# Patient Record
Sex: FEMALE | Race: WHITE | NOT HISPANIC OR LATINO | URBAN - METROPOLITAN AREA
[De-identification: names, ages, dates, MRNs, and addresses within clinical notes are randomized per-mention and may not be internally consistent; named-entity substitution may affect disease eponyms.]

---

## 2023-11-07 PROBLEM — G95.9 CERVICAL MYELOPATHY WITH CERVICAL RADICULOPATHY (MULTI): Status: ACTIVE | Noted: 2023-11-07

## 2023-11-07 PROBLEM — G95.20 CERVICAL SPINAL CORD COMPRESSION (MULTI): Status: ACTIVE | Noted: 2023-11-07

## 2023-11-07 PROBLEM — M40.202 CERVICAL KYPHOSIS: Status: ACTIVE | Noted: 2023-11-07

## 2023-11-07 PROBLEM — M54.16 CHRONIC LUMBAR RADICULOPATHY: Status: ACTIVE | Noted: 2023-11-07

## 2023-11-07 PROBLEM — M54.12 CERVICAL MYELOPATHY WITH CERVICAL RADICULOPATHY (MULTI): Status: ACTIVE | Noted: 2023-11-07

## 2023-11-07 PROBLEM — M43.12 SPONDYLOLISTHESIS OF CERVICAL REGION: Status: ACTIVE | Noted: 2023-11-07

## 2023-11-07 PROBLEM — M43.10 ACQUIRED SPONDYLOLISTHESIS: Status: ACTIVE | Noted: 2023-11-07

## 2023-11-07 RX ORDER — ZOLPIDEM TARTRATE 10 MG/1
TABLET ORAL
COMMUNITY
Start: 2020-07-01

## 2023-11-07 RX ORDER — ZOLPIDEM TARTRATE 5 MG/1
TABLET ORAL AS NEEDED
COMMUNITY

## 2023-11-07 RX ORDER — LORAZEPAM 1 MG/1
TABLET ORAL
COMMUNITY
Start: 2020-07-01

## 2023-11-07 RX ORDER — SENNOSIDES 25 MG/1
TABLET, FILM COATED ORAL 4 TIMES DAILY
COMMUNITY
Start: 2020-08-28

## 2023-11-07 RX ORDER — NEOMYCIN SULFATE, POLYMYXIN B SULFATE AND DEXAMETHASONE 3.5; 10000; 1 MG/ML; [USP'U]/ML; MG/ML
SUSPENSION/ DROPS OPHTHALMIC
COMMUNITY
Start: 2020-07-02

## 2023-11-07 RX ORDER — PROPRANOLOL HYDROCHLORIDE 20 MG/1
TABLET ORAL
COMMUNITY

## 2023-11-07 RX ORDER — LOSARTAN POTASSIUM 100 MG/1
TABLET ORAL
COMMUNITY
Start: 2020-06-13

## 2023-11-07 RX ORDER — GABAPENTIN 100 MG/1
CAPSULE ORAL
COMMUNITY
Start: 2020-09-08

## 2023-11-07 RX ORDER — AMLODIPINE BESYLATE 5 MG/1
TABLET ORAL 2 TIMES DAILY
COMMUNITY

## 2023-11-07 RX ORDER — ESCITALOPRAM OXALATE 20 MG/1
TABLET ORAL
COMMUNITY
Start: 2020-07-01

## 2023-11-07 RX ORDER — KETOROLAC TROMETHAMINE 10 MG/1
10 TABLET, FILM COATED ORAL 3 TIMES DAILY PRN
COMMUNITY
Start: 2020-09-04

## 2023-11-07 RX ORDER — DIAZEPAM 5 MG/1
.5-1 TABLET ORAL EVERY 8 HOURS PRN
COMMUNITY
Start: 2020-07-28

## 2023-11-07 RX ORDER — SYRING-NEEDL,DISP,INSUL,0.3 ML 29 G X1/2"
100 SYRINGE, EMPTY DISPOSABLE MISCELLANEOUS EVERY 8 HOURS
COMMUNITY
Start: 2020-07-24

## 2023-11-07 RX ORDER — ACETAMINOPHEN 325 MG/1
1-2 TABLET ORAL EVERY 4 HOURS PRN
COMMUNITY
Start: 2020-07-24

## 2023-11-07 RX ORDER — HYDROCODONE BITARTRATE AND ACETAMINOPHEN 5; 325 MG/1; MG/1
1 TABLET ORAL 3 TIMES DAILY PRN
COMMUNITY
Start: 2020-08-03

## 2023-11-07 RX ORDER — PANTOPRAZOLE SODIUM 20 MG/1
1 TABLET, DELAYED RELEASE ORAL DAILY
COMMUNITY

## 2023-11-07 RX ORDER — TIZANIDINE 4 MG/1
TABLET ORAL
COMMUNITY
Start: 2020-06-19

## 2023-11-07 RX ORDER — PANTOPRAZOLE SODIUM 40 MG/1
TABLET, DELAYED RELEASE ORAL
COMMUNITY
Start: 2020-06-14

## 2023-11-08 ENCOUNTER — APPOINTMENT (OUTPATIENT)
Dept: HEMATOLOGY/ONCOLOGY | Facility: CLINIC | Age: 68
End: 2023-11-08
Payer: COMMERCIAL

## 2023-12-28 ENCOUNTER — APPOINTMENT (OUTPATIENT)
Dept: HEMATOLOGY/ONCOLOGY | Facility: CLINIC | Age: 68
End: 2023-12-28
Payer: COMMERCIAL

## 2024-02-01 ENCOUNTER — OFFICE VISIT (OUTPATIENT)
Dept: HEMATOLOGY/ONCOLOGY | Facility: CLINIC | Age: 69
End: 2024-02-01
Payer: COMMERCIAL

## 2024-02-01 VITALS
DIASTOLIC BLOOD PRESSURE: 84 MMHG | OXYGEN SATURATION: 97 % | RESPIRATION RATE: 18 BRPM | HEIGHT: 67 IN | TEMPERATURE: 96.4 F | WEIGHT: 191.91 LBS | HEART RATE: 66 BPM | BODY MASS INDEX: 30.12 KG/M2 | SYSTOLIC BLOOD PRESSURE: 158 MMHG

## 2024-02-01 DIAGNOSIS — D47.3 ESSENTIAL THROMBOCYTOSIS (MULTI): Primary | ICD-10-CM

## 2024-02-01 LAB
ALBUMIN SERPL BCP-MCNC: 4.3 G/DL (ref 3.4–5)
ALP SERPL-CCNC: 68 U/L (ref 33–136)
ALT SERPL W P-5'-P-CCNC: 10 U/L (ref 7–45)
ANION GAP SERPL CALC-SCNC: 19 MMOL/L (ref 10–20)
AST SERPL W P-5'-P-CCNC: 11 U/L (ref 9–39)
BASOPHILS # BLD AUTO: 0.03 X10*3/UL (ref 0–0.1)
BASOPHILS NFR BLD AUTO: 0.4 %
BILIRUB SERPL-MCNC: 0.2 MG/DL (ref 0–1.2)
BUN SERPL-MCNC: 32 MG/DL (ref 6–23)
CALCIUM SERPL-MCNC: 10.7 MG/DL (ref 8.6–10.3)
CHLORIDE SERPL-SCNC: 99 MMOL/L (ref 98–107)
CO2 SERPL-SCNC: 22 MMOL/L (ref 21–32)
CREAT SERPL-MCNC: 1.63 MG/DL (ref 0.5–1.05)
EGFRCR SERPLBLD CKD-EPI 2021: 34 ML/MIN/1.73M*2
EOSINOPHIL # BLD AUTO: 0.76 X10*3/UL (ref 0–0.7)
EOSINOPHIL NFR BLD AUTO: 9.3 %
ERYTHROCYTE [DISTWIDTH] IN BLOOD BY AUTOMATED COUNT: 13.3 % (ref 11.5–14.5)
FERRITIN SERPL-MCNC: 133 NG/ML (ref 8–150)
FOLATE SERPL-MCNC: 11.5 NG/ML
GLUCOSE SERPL-MCNC: 170 MG/DL (ref 74–99)
HAV AB SER QL IA: REACTIVE
HBV CORE AB SER QL: NONREACTIVE
HBV SURFACE AG SERPL QL IA: NONREACTIVE
HCT VFR BLD AUTO: 36.5 % (ref 36–46)
HGB BLD-MCNC: 12.1 G/DL (ref 12–16)
HGB RETIC QN: 36 PG (ref 28–38)
IGA SERPL-MCNC: 218 MG/DL (ref 70–400)
IGG SERPL-MCNC: 1620 MG/DL (ref 700–1600)
IGM SERPL-MCNC: 30 MG/DL (ref 40–230)
IMM GRANULOCYTES # BLD AUTO: 0.02 X10*3/UL (ref 0–0.7)
IMM GRANULOCYTES NFR BLD AUTO: 0.2 % (ref 0–0.9)
IMMATURE RETIC FRACTION: 5.4 %
IRON SATN MFR SERPL: 18 % (ref 25–45)
IRON SERPL-MCNC: 65 UG/DL (ref 35–150)
LDH SERPL L TO P-CCNC: 91 U/L (ref 84–246)
LYMPHOCYTES # BLD AUTO: 4.6 X10*3/UL (ref 1.2–4.8)
LYMPHOCYTES NFR BLD AUTO: 56.2 %
MCH RBC QN AUTO: 31.3 PG (ref 26–34)
MCHC RBC AUTO-ENTMCNC: 33.2 G/DL (ref 32–36)
MCV RBC AUTO: 94 FL (ref 80–100)
MONOCYTES # BLD AUTO: 0.72 X10*3/UL (ref 0.1–1)
MONOCYTES NFR BLD AUTO: 8.8 %
NEUTROPHILS # BLD AUTO: 2.05 X10*3/UL (ref 1.2–7.7)
NEUTROPHILS NFR BLD AUTO: 25.1 %
NRBC BLD-RTO: ABNORMAL /100{WBCS}
PLATELET # BLD AUTO: 474 X10*3/UL (ref 150–450)
POTASSIUM SERPL-SCNC: 3.4 MMOL/L (ref 3.5–5.3)
PROT SERPL-MCNC: 7.4 G/DL (ref 6.4–8.2)
PROT SERPL-MCNC: 7.8 G/DL (ref 6.4–8.2)
RBC # BLD AUTO: 3.87 X10*6/UL (ref 4–5.2)
RETICS #: 0.06 X10*6/UL (ref 0.02–0.11)
RETICS/RBC NFR AUTO: 1.6 % (ref 0.5–2)
SODIUM SERPL-SCNC: 137 MMOL/L (ref 136–145)
TIBC SERPL-MCNC: 359 UG/DL (ref 240–445)
UIBC SERPL-MCNC: 294 UG/DL (ref 110–370)
VIT B12 SERPL-MCNC: 373 PG/ML (ref 211–911)
WBC # BLD AUTO: 8.2 X10*3/UL (ref 4.4–11.3)

## 2024-02-01 PROCEDURE — 1159F MED LIST DOCD IN RCRD: CPT | Performed by: PHYSICIAN ASSISTANT

## 2024-02-01 PROCEDURE — 99205 OFFICE O/P NEW HI 60 MIN: CPT | Performed by: PHYSICIAN ASSISTANT

## 2024-02-01 PROCEDURE — 1036F TOBACCO NON-USER: CPT | Performed by: PHYSICIAN ASSISTANT

## 2024-02-01 PROCEDURE — 82728 ASSAY OF FERRITIN: CPT | Performed by: PHYSICIAN ASSISTANT

## 2024-02-01 PROCEDURE — 83615 LACTATE (LD) (LDH) ENZYME: CPT | Performed by: PHYSICIAN ASSISTANT

## 2024-02-01 PROCEDURE — 1125F AMNT PAIN NOTED PAIN PRSNT: CPT | Performed by: PHYSICIAN ASSISTANT

## 2024-02-01 PROCEDURE — 87340 HEPATITIS B SURFACE AG IA: CPT | Mod: GEALAB | Performed by: PHYSICIAN ASSISTANT

## 2024-02-01 PROCEDURE — 86334 IMMUNOFIX E-PHORESIS SERUM: CPT | Mod: GEALAB | Performed by: PHYSICIAN ASSISTANT

## 2024-02-01 PROCEDURE — 36415 COLL VENOUS BLD VENIPUNCTURE: CPT | Performed by: PHYSICIAN ASSISTANT

## 2024-02-01 PROCEDURE — 86704 HEP B CORE ANTIBODY TOTAL: CPT | Mod: GEALAB | Performed by: PHYSICIAN ASSISTANT

## 2024-02-01 PROCEDURE — 99215 OFFICE O/P EST HI 40 MIN: CPT | Performed by: PHYSICIAN ASSISTANT

## 2024-02-01 PROCEDURE — 80053 COMPREHEN METABOLIC PANEL: CPT | Performed by: PHYSICIAN ASSISTANT

## 2024-02-01 PROCEDURE — 86706 HEP B SURFACE ANTIBODY: CPT | Mod: GEALAB | Performed by: PHYSICIAN ASSISTANT

## 2024-02-01 PROCEDURE — 85025 COMPLETE CBC W/AUTO DIFF WBC: CPT | Performed by: PHYSICIAN ASSISTANT

## 2024-02-01 PROCEDURE — 86708 HEPATITIS A ANTIBODY: CPT | Mod: GEALAB | Performed by: PHYSICIAN ASSISTANT

## 2024-02-01 PROCEDURE — 84165 PROTEIN E-PHORESIS SERUM: CPT | Performed by: PHYSICIAN ASSISTANT

## 2024-02-01 PROCEDURE — 86038 ANTINUCLEAR ANTIBODIES: CPT | Mod: GEALAB | Performed by: PHYSICIAN ASSISTANT

## 2024-02-01 PROCEDURE — 83521 IG LIGHT CHAINS FREE EACH: CPT | Mod: GEALAB | Performed by: PHYSICIAN ASSISTANT

## 2024-02-01 PROCEDURE — 84155 ASSAY OF PROTEIN SERUM: CPT | Mod: GEALAB | Performed by: PHYSICIAN ASSISTANT

## 2024-02-01 PROCEDURE — 86320 SERUM IMMUNOELECTROPHORESIS: CPT | Performed by: PHYSICIAN ASSISTANT

## 2024-02-01 PROCEDURE — 82784 ASSAY IGA/IGD/IGG/IGM EACH: CPT | Mod: GEALAB | Performed by: PHYSICIAN ASSISTANT

## 2024-02-01 PROCEDURE — G0452 MOLECULAR PATHOLOGY INTERPR: HCPCS | Performed by: PHYSICIAN ASSISTANT

## 2024-02-01 PROCEDURE — 82607 VITAMIN B-12: CPT | Mod: GEALAB | Performed by: PHYSICIAN ASSISTANT

## 2024-02-01 PROCEDURE — 81450 HL NEO GSAP 5-50DNA/DNA&RNA: CPT | Performed by: PHYSICIAN ASSISTANT

## 2024-02-01 PROCEDURE — 83540 ASSAY OF IRON: CPT | Performed by: PHYSICIAN ASSISTANT

## 2024-02-01 PROCEDURE — 85045 AUTOMATED RETICULOCYTE COUNT: CPT | Performed by: PHYSICIAN ASSISTANT

## 2024-02-01 PROCEDURE — 82746 ASSAY OF FOLIC ACID SERUM: CPT | Mod: GEALAB | Performed by: PHYSICIAN ASSISTANT

## 2024-02-01 ASSESSMENT — ENCOUNTER SYMPTOMS
OCCASIONAL FEELINGS OF UNSTEADINESS: 0
DEPRESSION: 0

## 2024-02-01 ASSESSMENT — PATIENT HEALTH QUESTIONNAIRE - PHQ9
1. LITTLE INTEREST OR PLEASURE IN DOING THINGS: NOT AT ALL
2. FEELING DOWN, DEPRESSED OR HOPELESS: NOT AT ALL
SUM OF ALL RESPONSES TO PHQ9 QUESTIONS 1 AND 2: 0

## 2024-02-01 ASSESSMENT — PAIN SCALES - GENERAL: PAINLEVEL: 8

## 2024-02-01 ASSESSMENT — COLUMBIA-SUICIDE SEVERITY RATING SCALE - C-SSRS
2. HAVE YOU ACTUALLY HAD ANY THOUGHTS OF KILLING YOURSELF?: NO
6. HAVE YOU EVER DONE ANYTHING, STARTED TO DO ANYTHING, OR PREPARED TO DO ANYTHING TO END YOUR LIFE?: NO
1. IN THE PAST MONTH, HAVE YOU WISHED YOU WERE DEAD OR WISHED YOU COULD GO TO SLEEP AND NOT WAKE UP?: NO

## 2024-02-01 NOTE — PROGRESS NOTES
Reason for Visit  Lita Garcia is a 68 y.o. AA female, everyday smoker,  referred by Dr. Carrizales for thrombocytosis.    PMH/PSH: HTN, Pre-DM, Vitamin D deficiency, Vitamin B12 deficiency, osteoporosis, Chronic pain syndrome.  FH: NC  Soc Hx: Smoker, denies ETOH and illicit drugs; Retired clinical therapist, , 2 children.    History of Present Illness:  Upon review of labs, noted to have thrombocytosis since 2020 with intermittent leucocytosis and anemia.     On assessment, reports back pain due to nerve damage otherwise feeling well. Denies B symptoms, n/v/d/abd pain, SOB/SOB, CP, palpitations, rash or any other complaint at this time.    Review of Systems: All of the systems have been reviewed and are negative for complaints except what is stated in the HPI and/or past medical history.    Allergies and Intolerances:  Allergies   Allergen Reactions    Diphenhydramine Hcl Anxiety and Unknown    Gabapentin Nausea And Vomiting and Nausea/vomiting    Meloxicam Nausea/vomiting    Oxycodone Itching    Oxycodone-Acetaminophen Itching              Outpatient Medication Profile:  Current Outpatient Medications   Medication Sig Dispense Refill    amLODIPine (Norvasc) 5 mg tablet Take by mouth twice a day.      LORazepam (Ativan) 1 mg tablet LORazepam 1 MG Oral Tablet   Quantity: 30  Refills: 0        Start : 1-Jul-2020   Active      losartan (Cozaar) 100 mg tablet Losartan Potassium 100 MG Oral Tablet   Quantity: 30  Refills: 0        Start : 13-Jun-2020   Active      pantoprazole (ProtoNix) 40 mg EC tablet Pantoprazole Sodium 40 MG Oral Tablet Delayed Release   Quantity: 30  Refills: 0        Start : 14-Jun-2020   Active      propranolol (Inderal) 20 mg tablet orally once a day (at bedtime)      tiZANidine (Zanaflex) 4 mg tablet tiZANidine HCl - 4 MG Oral Tablet   Quantity: 30  Refills: 0        Start : 19-Jun-2020   Active      acetaminophen (Tylenol) 325 mg tablet Take 1-2 tablets (325-650 mg) by mouth every 4  hours if needed for mild pain (1 - 3).      diazePAM (Valium) 5 mg tablet Take 0.5-1 tablets (2.5-5 mg) by mouth every 8 hours if needed for muscle spasms.      escitalopram (Lexapro) 20 mg tablet Escitalopram Oxalate 20 MG Oral Tablet   Quantity: 30  Refills: 0        Start : 1-Jul-2020   Active      gabapentin (Neurontin) 100 mg capsule Take 1 pill 3 times daily for 3 days; then take 2 pills 3 times daily for 3 days; then take 3 pills 3 times daily. Stay at this dose      HYDROcodone-acetaminophen (Norco) 5-325 mg tablet Take 1 tablet by mouth 3 times a day as needed for moderate pain (4 - 6).      ketorolac (Toradol) 10 mg tablet Take 1 tablet (10 mg) by mouth 3 times a day as needed (Pain).      lidocaine (Xylocaine) 5 % cream cream 4 times a day.      magnesium citrate solution Take 100 mL by mouth every 8 hours.      neomycin-polymyxin-dexAMETHasone (Maxitrol) 3.5mg/mL-10,000 unit/mL-0.1 % ophthalmic suspension Neomycin-Polymyxin-Dexameth 3.5-62068-4.1 Ophthalmic Suspension   Quantity: 5  Refills: 0        Start : 2-Jul-2020   Active      pantoprazole (Protonix) 20 mg EC tablet Take 1 tablet (20 mg) by mouth once daily.      propranolol XL (Innopran XL) 80 mg 24 hr capsule Propranolol HCl ER 80 MG Oral Capsule Extended Release 24 Hour   Quantity: 30  Refills: 0        Start : 19-Jun-2020   Active      zolpidem (Ambien) 10 mg tablet Zolpidem Tartrate 10 MG Oral Tablet   Quantity: 30  Refills: 0        Start : 1-Jul-2020   Active      zolpidem (Ambien) 5 mg tablet if needed.       No current facility-administered medications for this visit.        Vitals and Measurements:   Visit Vitals  /84 (BP Location: Left arm, Patient Position: Sitting, BP Cuff Size: Large adult)   Pulse 66   Temp 35.8 °C (96.4 °F) (Temporal)   Resp 18        Physical Exam:   Constitutional: alert, awake and oriented, not in acute distress   HEENT: moist mucous membranes, normal nose   Neck: supple, no lymphadenopathy   EYES: PERRL,  "EOM intact, conjunctiva normal  Skin: no jaundice, rash or erythema  Neurological: AAOx3, no gross focal deficit   Psychiatric: normal mood and behavior     Lab Results   Component Value Date    WBC 8.2 02/01/2024    NEUTROABS 2.05 02/01/2024    IGABSOL 0.02 02/01/2024    LYMPHSABS 4.60 02/01/2024    MONOSABS 0.72 02/01/2024    EOSABS 0.76 (H) 02/01/2024    BASOSABS 0.03 02/01/2024    RBC 3.87 (L) 02/01/2024    MCV 94 02/01/2024    MCHC 33.2 02/01/2024    HGB 12.1 02/01/2024    HCT 36.5 02/01/2024     (H) 02/01/2024     Lab Results   Component Value Date    RETICCTPCT 1.6 02/01/2024      Lab Results   Component Value Date    CREATININE 1.63 (H) 02/01/2024    BUN 32 (H) 02/01/2024    EGFR 34 (L) 02/01/2024     02/01/2024    K 3.4 (L) 02/01/2024    CL 99 02/01/2024    CO2 22 02/01/2024      Lab Results   Component Value Date    ALT 10 02/01/2024    AST 11 02/01/2024    ALKPHOS 68 02/01/2024    BILITOT 0.2 02/01/2024      No results found for: \"TSH\"  No results found for: \"TSH\", \"D2FPGYH\", \"I8RJSVN\", \"THYROIDPAB\"  Lab Results   Component Value Date    IRON 65 02/01/2024    TIBC 359 02/01/2024    FERRITIN 133 02/01/2024      Lab Results   Component Value Date    IEXSKGIN22 373 02/01/2024      Lab Results   Component Value Date    FOLATE 11.5 02/01/2024     No results found for: \"SULEIMAN\", \"RF\", \"SEDRATE\"   Lab Results   Component Value Date    CRP 0.62 08/06/2021      No results found for: \"ABENA\"  Lab Results   Component Value Date    LDH 91 02/01/2024      Assessment:    68 y.o. AA female, everyday smoker,  referred by Dr. Carrizales for thrombocytosis.      Plan:    Reviewed and discussed lab, imaging, and pathology results with patient in detail as well as diagnosis, prognosis, and treatment options.    Will sed work up including MPN NGS, BCR-able and plasma cell dyscrasia.    Discussed staying up to date with screening studies    F/U w/PCP    RTC in 2-3 weeks    Patient verbalized understanding, and all her " questions were answered to her satisfaction.     60 min spent with patient greater than 50 % of which was spent in consultation, counselling and coordination of care.

## 2024-02-02 ENCOUNTER — TELEPHONE (OUTPATIENT)
Dept: HEMATOLOGY/ONCOLOGY | Facility: CLINIC | Age: 69
End: 2024-02-02
Payer: COMMERCIAL

## 2024-02-02 LAB
ANA SER QL HEP2 SUBST: NEGATIVE
HBV SURFACE AB SER-ACNC: 12.3 MIU/ML
KAPPA LC SERPL-MCNC: 6.07 MG/DL (ref 0.33–1.94)
KAPPA LC/LAMBDA SER: 3.57 {RATIO} (ref 0.26–1.65)
LAMBDA LC SERPL-MCNC: 1.7 MG/DL (ref 0.57–2.63)

## 2024-02-05 LAB
ALBUMIN: 4.3 G/DL (ref 3.4–5)
ALPHA 1 GLOBULIN: 0.3 G/DL (ref 0.2–0.6)
ALPHA 2 GLOBULIN: 1 G/DL (ref 0.4–1.1)
BETA GLOBULIN: 1 G/DL (ref 0.5–1.2)
GAMMA GLOBULIN: 1.2 G/DL (ref 0.5–1.4)
IMMUNOFIXATION COMMENT: NORMAL
PATH REVIEW - SERUM IMMUNOFIXATION: NORMAL
PATH REVIEW-SERUM PROTEIN ELECTROPHORESIS: NORMAL
PROTEIN ELECTROPHORESIS COMMENT: NORMAL

## 2024-02-07 LAB
ELECTRONICALLY SIGNED BY: NORMAL
MYELOID NGS RESULTS: NORMAL

## 2024-02-21 ENCOUNTER — TELEMEDICINE (OUTPATIENT)
Dept: HEMATOLOGY/ONCOLOGY | Facility: CLINIC | Age: 69
End: 2024-02-21
Payer: COMMERCIAL

## 2024-02-21 DIAGNOSIS — D47.3 ESSENTIAL THROMBOCYTOSIS (MULTI): Primary | ICD-10-CM

## 2024-02-21 PROCEDURE — 1125F AMNT PAIN NOTED PAIN PRSNT: CPT | Performed by: PHYSICIAN ASSISTANT

## 2024-02-21 PROCEDURE — 1159F MED LIST DOCD IN RCRD: CPT | Performed by: PHYSICIAN ASSISTANT

## 2024-02-21 PROCEDURE — 99214 OFFICE O/P EST MOD 30 MIN: CPT | Performed by: PHYSICIAN ASSISTANT

## 2024-02-21 PROCEDURE — 1036F TOBACCO NON-USER: CPT | Performed by: PHYSICIAN ASSISTANT

## 2024-02-21 NOTE — PROGRESS NOTES
Visit Type: Benign Heme Follow-up, Virtual Visit:  A Virtual visit (telephone only) between the patient (at the originating site) and the provider (at the distant site) was utilized to provide this telehealth service.   Verbal Consent for Encounter: Verbal consent was requested and obtained from patient, or from parent/guardian if minor, on this date for a telehealth visit.     Reason for Visit  Lita Garcia is a 68 y.o. AA female, everyday smoker,  referred by Dr. Carrizales for thrombocytosis.    Upon review of labs, noted to have thrombocytosis since 2020 with intermittent leucocytosis and anemia.     On assessment, reports back pain due to nerve damage otherwise feeling well. Denies B symptoms, n/v/d/abd pain, SOB/SOB, CP, palpitations, rash or any other complaint at this time.    PMH/PSH: HTN, Pre-DM, Vitamin D deficiency, Vitamin B12 deficiency, osteoporosis, Chronic pain syndrome.  FH: NC  Soc Hx: Smoker, denies ETOH and illicit drugs; Retired clinical therapist, , 2 children.    History of Present Illness:  C/o neck pain but otherwise doing well.     Review of Systems: All of the systems have been reviewed and are negative for complaints except what is stated in the HPI and/or past medical history.    Allergies and Intolerances:  Allergies   Allergen Reactions    Diphenhydramine Hcl Anxiety and Unknown    Gabapentin Nausea And Vomiting and Nausea/vomiting    Meloxicam Nausea/vomiting    Oxycodone Itching    Oxycodone-Acetaminophen Itching              Outpatient Medication Profile:  Current Outpatient Medications   Medication Sig Dispense Refill    acetaminophen (Tylenol) 325 mg tablet Take 1-2 tablets (325-650 mg) by mouth every 4 hours if needed for mild pain (1 - 3).      amLODIPine (Norvasc) 5 mg tablet Take by mouth twice a day.      diazePAM (Valium) 5 mg tablet Take 0.5-1 tablets (2.5-5 mg) by mouth every 8 hours if needed for muscle spasms.      escitalopram (Lexapro) 20 mg tablet Escitalopram  Oxalate 20 MG Oral Tablet   Quantity: 30  Refills: 0        Start : 1-Jul-2020   Active      gabapentin (Neurontin) 100 mg capsule Take 1 pill 3 times daily for 3 days; then take 2 pills 3 times daily for 3 days; then take 3 pills 3 times daily. Stay at this dose      HYDROcodone-acetaminophen (Norco) 5-325 mg tablet Take 1 tablet by mouth 3 times a day as needed for moderate pain (4 - 6).      ketorolac (Toradol) 10 mg tablet Take 1 tablet (10 mg) by mouth 3 times a day as needed (Pain).      lidocaine (Xylocaine) 5 % cream cream 4 times a day.      LORazepam (Ativan) 1 mg tablet LORazepam 1 MG Oral Tablet   Quantity: 30  Refills: 0        Start : 1-Jul-2020   Active      losartan (Cozaar) 100 mg tablet Losartan Potassium 100 MG Oral Tablet   Quantity: 30  Refills: 0        Start : 13-Jun-2020   Active      magnesium citrate solution Take 100 mL by mouth every 8 hours.      neomycin-polymyxin-dexAMETHasone (Maxitrol) 3.5mg/mL-10,000 unit/mL-0.1 % ophthalmic suspension Neomycin-Polymyxin-Dexameth 3.5-44565-9.1 Ophthalmic Suspension   Quantity: 5  Refills: 0        Start : 2-Jul-2020   Active      pantoprazole (Protonix) 20 mg EC tablet Take 1 tablet (20 mg) by mouth once daily.      pantoprazole (ProtoNix) 40 mg EC tablet Pantoprazole Sodium 40 MG Oral Tablet Delayed Release   Quantity: 30  Refills: 0        Start : 14-Jun-2020   Active      propranolol (Inderal) 20 mg tablet orally once a day (at bedtime)      propranolol XL (Innopran XL) 80 mg 24 hr capsule Propranolol HCl ER 80 MG Oral Capsule Extended Release 24 Hour   Quantity: 30  Refills: 0        Start : 19-Jun-2020   Active      tiZANidine (Zanaflex) 4 mg tablet tiZANidine HCl - 4 MG Oral Tablet   Quantity: 30  Refills: 0        Start : 19-Jun-2020   Active      zolpidem (Ambien) 10 mg tablet Zolpidem Tartrate 10 MG Oral Tablet   Quantity: 30  Refills: 0        Start : 1-Jul-2020   Active      zolpidem (Ambien) 5 mg tablet if needed.       No current  "facility-administered medications for this visit.        Vitals and Measurements:   There were no vitals taken for this visit.       Physical Exam:   Deferred due to telehealth    Lab Results   Component Value Date    WBC 8.2 02/01/2024    NEUTROABS 2.05 02/01/2024    IGABSOL 0.02 02/01/2024    LYMPHSABS 4.60 02/01/2024    MONOSABS 0.72 02/01/2024    EOSABS 0.76 (H) 02/01/2024    BASOSABS 0.03 02/01/2024    RBC 3.87 (L) 02/01/2024    MCV 94 02/01/2024    MCHC 33.2 02/01/2024    HGB 12.1 02/01/2024    HCT 36.5 02/01/2024     (H) 02/01/2024     Lab Results   Component Value Date    RETICCTPCT 1.6 02/01/2024      Lab Results   Component Value Date    CREATININE 1.63 (H) 02/01/2024    BUN 32 (H) 02/01/2024    EGFR 34 (L) 02/01/2024     02/01/2024    K 3.4 (L) 02/01/2024    CL 99 02/01/2024    CO2 22 02/01/2024      Lab Results   Component Value Date    ALT 10 02/01/2024    AST 11 02/01/2024    ALKPHOS 68 02/01/2024    BILITOT 0.2 02/01/2024      No results found for: \"TSH\"  No results found for: \"TSH\", \"W2TEQUJ\", \"O9SPRRB\", \"THYROIDPAB\"  Lab Results   Component Value Date    IRON 65 02/01/2024    TIBC 359 02/01/2024    FERRITIN 133 02/01/2024      Lab Results   Component Value Date    CSCIDUPV52 373 02/01/2024      Lab Results   Component Value Date    FOLATE 11.5 02/01/2024     Lab Results   Component Value Date    SULEIMAN Negative 02/01/2024      Lab Results   Component Value Date    CRP 0.62 08/06/2021      No results found for: \"ABENA\"  Lab Results   Component Value Date    LDH 91 02/01/2024      Office Visit on 02/01/2024   Component Date Value Ref Range Status    WBC 02/01/2024 8.2  4.4 - 11.3 x10*3/uL Final    nRBC 02/01/2024    Final    Not Measured    RBC 02/01/2024 3.87 (L)  4.00 - 5.20 x10*6/uL Final    Hemoglobin 02/01/2024 12.1  12.0 - 16.0 g/dL Final    Hematocrit 02/01/2024 36.5  36.0 - 46.0 % Final    MCV 02/01/2024 94  80 - 100 fL Final    MCH 02/01/2024 31.3  26.0 - 34.0 pg Final    MCHC " 02/01/2024 33.2  32.0 - 36.0 g/dL Final    RDW 02/01/2024 13.3  11.5 - 14.5 % Final    Platelets 02/01/2024 474 (H)  150 - 450 x10*3/uL Final    Neutrophils % 02/01/2024 25.1  40.0 - 80.0 % Final    Immature Granulocytes %, Automated 02/01/2024 0.2  0.0 - 0.9 % Final    Immature Granulocyte Count (IG) includes promyelocytes, myelocytes and metamyelocytes but does not include bands. Percent differential counts (%) should be interpreted in the context of the absolute cell counts (cells/UL).    Lymphocytes % 02/01/2024 56.2  13.0 - 44.0 % Final    Monocytes % 02/01/2024 8.8  2.0 - 10.0 % Final    Eosinophils % 02/01/2024 9.3  0.0 - 6.0 % Final    Basophils % 02/01/2024 0.4  0.0 - 2.0 % Final    Neutrophils Absolute 02/01/2024 2.05  1.20 - 7.70 x10*3/uL Final    Percent differential counts (%) should be interpreted in the context of the absolute cell counts (cells/uL).    Immature Granulocytes Absolute, Au* 02/01/2024 0.02  0.00 - 0.70 x10*3/uL Final    Lymphocytes Absolute 02/01/2024 4.60  1.20 - 4.80 x10*3/uL Final    Monocytes Absolute 02/01/2024 0.72  0.10 - 1.00 x10*3/uL Final    Eosinophils Absolute 02/01/2024 0.76 (H)  0.00 - 0.70 x10*3/uL Final    Basophils Absolute 02/01/2024 0.03  0.00 - 0.10 x10*3/uL Final    Glucose 02/01/2024 170 (H)  74 - 99 mg/dL Final    Sodium 02/01/2024 137  136 - 145 mmol/L Final    Potassium 02/01/2024 3.4 (L)  3.5 - 5.3 mmol/L Final    Chloride 02/01/2024 99  98 - 107 mmol/L Final    Bicarbonate 02/01/2024 22  21 - 32 mmol/L Final    Anion Gap 02/01/2024 19  10 - 20 mmol/L Final    Urea Nitrogen 02/01/2024 32 (H)  6 - 23 mg/dL Final    Creatinine 02/01/2024 1.63 (H)  0.50 - 1.05 mg/dL Final    eGFR 02/01/2024 34 (L)  >60 mL/min/1.73m*2 Final    Calculations of estimated GFR are performed using the 2021 CKD-EPI Study Refit equation without the race variable for the IDMS-Traceable creatinine methods.  https://jasn.asnjournals.org/content/early/2021/09/22/ASN.1858488005    Calcium  "02/01/2024 10.7 (H)  8.6 - 10.3 mg/dL Final    Albumin 02/01/2024 4.3  3.4 - 5.0 g/dL Final    Alkaline Phosphatase 02/01/2024 68  33 - 136 U/L Final    Total Protein 02/01/2024 7.4  6.4 - 8.2 g/dL Final    AST 02/01/2024 11  9 - 39 U/L Final    Bilirubin, Total 02/01/2024 0.2  0.0 - 1.2 mg/dL Final    ALT 02/01/2024 10  7 - 45 U/L Final    Patients treated with Sulfasalazine may generate falsely decreased results for ALT.    Ferritin 02/01/2024 133  8 - 150 ng/mL Final    LDH 02/01/2024 91  84 - 246 U/L Final    Iron 02/01/2024 65  35 - 150 ug/dL Final    UIBC 02/01/2024 294  110 - 370 ug/dL Final    TIBC 02/01/2024 359  240 - 445 ug/dL Final    % Saturation 02/01/2024 18 (L)  25 - 45 % Final    Vitamin B12 02/01/2024 373  211 - 911 pg/mL Final    Retic % 02/01/2024 1.6  0.5 - 2.0 % Final    Retic Absolute 02/01/2024 0.063  0.017 - 0.110 x10*6/uL Final    Reticulocyte Hemoglobin 02/01/2024 36  28 - 38 pg Final    Immature Retic fraction 02/01/2024 5.4  <=16.0 % Final    Reticulocytes are measured based on a fluorescent technique. The IRF, or immature reticulocyte fraction, is the percent of reticulocytes that show medium (MFR) or high (HFR) fluorescence.  This value can be used to assess the relative maturity of the reticulocyte population in response to anemia. The \"shift reticulocytes\" are not measured by this technique, eliminating the need for their correction in the reticulocyte index.    Hepatitis B Surface AG 02/01/2024 Nonreactive  Nonreactive Final    Biotin interference may cause falsely decreased results. Patients taking a Biotin dose of up to 5 mg/day should refrain from taking Biotin for 24 hours before sample collection. Providers may contact their local laboratory for  further information.    Hepatitis B Core AB- Total 02/01/2024 Nonreactive  Nonreactive Final    Hepatitis A  AB-Total 02/01/2024 Reactive (A)  Nonreactive Final    Folate, Serum 02/01/2024 11.5  >5.0 ng/mL Final    Myeloid Malignancies " Results 02/01/2024    Final                    Value:This result contains rich text formatting which cannot be displayed here.    Electronically signed and reported* 02/01/2024    Final                    Value:Carlotta Esposito MD    Ig [employer] Free Light Chain 02/01/2024 6.07 (H)  0.33 - 1.94 mg/dL Final    Ig Lambda Free Light Chain 02/01/2024 1.70  0.57 - 2.63 mg/dL Final    Kappa/Lambda Ratio 02/01/2024 3.57 (H)  0.26 - 1.65 Final    IgG 02/01/2024 1,620 (H)  700 - 1,600 mg/dL Final    IgA 02/01/2024 218  70 - 400 mg/dL Final    IgM 02/01/2024 30 (L)  40 - 230 mg/dL Final    SULEIMAN 02/01/2024 Negative  Negative Final    The Antinuclear Antibody (SULEIMAN) test was performed using  indirect immunofluorescence assay with HEp-2 cells slide.    Total Protein 02/01/2024 7.8  6.4 - 8.2 g/dL Final    Albumin 02/01/2024 4.3  3.4 - 5.0 g/dL Final    Alpha 1 Globulin 02/01/2024 0.3  0.2 - 0.6 g/dL Final    Alpha 2 Globulin 02/01/2024 1.0  0.4 - 1.1 g/dL Final    Beta Globulin 02/01/2024 1.0  0.5 - 1.2 g/dL Final    Gamma 02/01/2024 1.2  0.5 - 1.4 g/dL Final    Protein Electrophoresis Comment 02/01/2024 Normal.   Final    Immunofixation Comment 02/01/2024 No monoclonal protein detected by immunofixation.   Final    Path Review - Serum Protein Electr* 02/01/2024 Reviewed and approved by TIM WATT on 2/5/24 at 9:52 PM.      Final    Path Review - Serum Immunofixation 02/01/2024 Reviewed and approved by TIM WATT on 2/5/24 at 9:52 PM.      Final    Hepatitis B Surface AB 02/01/2024 12.3 (H)  <10.0 mIU/mL Final    Interpretive Criteria:                         <10 mIU/mL    Nonreactive                          >=10 mIU/mL    Reactive     Biotin interference may cause falsely decreased results. Patients taking a Biotin dose of up to 5 mg/day should refrain from taking Biotin for 24 hours before sample collection. Providers may contact their local laboratory for further information.     Assessment:    68 y.o. AA female,  everyday smoker referred for chronic thrombocytosis.      Plan:    Reviewed and discussed lab, imaging, and pathology results with patient in detail as well as diagnosis, prognosis, and treatment options.    MPN NGS normal, Will monitor elevated FLC but no M protein, BCR-able not sent, patient will do this week.    Discussed performing BMBx to r/o other etiologies patient prefers to be monitored.    Discussed staying up to date with screening studies    F/U w/PCP    RTC in 6 months    Patient verbalized understanding, and all her questions were answered to her satisfaction.     30 min spent with patient greater than 50 % of which was spent in consultation, counselling and coordination of care via telehealth.

## 2024-03-04 ENCOUNTER — LAB (OUTPATIENT)
Dept: LAB | Facility: LAB | Age: 69
End: 2024-03-04
Payer: COMMERCIAL

## 2024-03-04 DIAGNOSIS — D47.3 ESSENTIAL THROMBOCYTOSIS (MULTI): ICD-10-CM

## 2024-03-04 LAB
BASOPHILS # BLD AUTO: 0.02 X10*3/UL (ref 0–0.1)
BASOPHILS # BLD AUTO: 0.02 X10*3/UL (ref 0–0.1)
BASOPHILS NFR BLD AUTO: 0.1 %
BASOPHILS NFR BLD AUTO: 0.1 %
CRP SERPL-MCNC: 0.43 MG/DL
EOSINOPHIL # BLD AUTO: 0 X10*3/UL (ref 0–0.7)
EOSINOPHIL # BLD AUTO: 0.01 X10*3/UL (ref 0–0.7)
EOSINOPHIL NFR BLD AUTO: 0 %
EOSINOPHIL NFR BLD AUTO: 0.1 %
ERYTHROCYTE [DISTWIDTH] IN BLOOD BY AUTOMATED COUNT: 13.6 % (ref 11.5–14.5)
ERYTHROCYTE [DISTWIDTH] IN BLOOD BY AUTOMATED COUNT: 13.7 % (ref 11.5–14.5)
ERYTHROCYTE [SEDIMENTATION RATE] IN BLOOD BY WESTERGREN METHOD: 38 MM/H (ref 0–30)
HCT VFR BLD AUTO: 35.1 % (ref 36–46)
HCT VFR BLD AUTO: 35.1 % (ref 36–46)
HGB BLD-MCNC: 12.1 G/DL (ref 12–16)
HGB BLD-MCNC: 12.1 G/DL (ref 12–16)
IMM GRANULOCYTES # BLD AUTO: 0.07 X10*3/UL (ref 0–0.7)
IMM GRANULOCYTES # BLD AUTO: 0.1 X10*3/UL (ref 0–0.7)
IMM GRANULOCYTES NFR BLD AUTO: 0.5 % (ref 0–0.9)
IMM GRANULOCYTES NFR BLD AUTO: 0.7 % (ref 0–0.9)
LYMPHOCYTES # BLD AUTO: 3.54 X10*3/UL (ref 1.2–4.8)
LYMPHOCYTES # BLD AUTO: 3.57 X10*3/UL (ref 1.2–4.8)
LYMPHOCYTES NFR BLD AUTO: 23.8 %
LYMPHOCYTES NFR BLD AUTO: 24.4 %
MCH RBC QN AUTO: 31.8 PG (ref 26–34)
MCH RBC QN AUTO: 31.9 PG (ref 26–34)
MCHC RBC AUTO-ENTMCNC: 34.5 G/DL (ref 32–36)
MCHC RBC AUTO-ENTMCNC: 34.5 G/DL (ref 32–36)
MCV RBC AUTO: 92 FL (ref 80–100)
MCV RBC AUTO: 93 FL (ref 80–100)
MONOCYTES # BLD AUTO: 0.78 X10*3/UL (ref 0.1–1)
MONOCYTES # BLD AUTO: 0.9 X10*3/UL (ref 0.1–1)
MONOCYTES NFR BLD AUTO: 5.4 %
MONOCYTES NFR BLD AUTO: 6 %
NEUTROPHILS # BLD AUTO: 10.1 X10*3/UL (ref 1.2–7.7)
NEUTROPHILS # BLD AUTO: 10.42 X10*3/UL (ref 1.2–7.7)
NEUTROPHILS NFR BLD AUTO: 69.4 %
NEUTROPHILS NFR BLD AUTO: 69.5 %
NRBC BLD-RTO: 0 /100 WBCS (ref 0–0)
NRBC BLD-RTO: 0 /100 WBCS (ref 0–0)
PLATELET # BLD AUTO: 556 X10*3/UL (ref 150–450)
PLATELET # BLD AUTO: 570 X10*3/UL (ref 150–450)
RBC # BLD AUTO: 3.79 X10*6/UL (ref 4–5.2)
RBC # BLD AUTO: 3.81 X10*6/UL (ref 4–5.2)
WBC # BLD AUTO: 14.5 X10*3/UL (ref 4.4–11.3)
WBC # BLD AUTO: 15 X10*3/UL (ref 4.4–11.3)

## 2024-03-04 PROCEDURE — 88275 CYTOGENETICS 100-300: CPT

## 2024-03-04 PROCEDURE — 88185 FLOWCYTOMETRY/TC ADD-ON: CPT

## 2024-03-04 PROCEDURE — 88271 CYTOGENETICS DNA PROBE: CPT

## 2024-03-04 PROCEDURE — 88184 FLOWCYTOMETRY/ TC 1 MARKER: CPT

## 2024-03-04 PROCEDURE — 88237 TISSUE CULTURE BONE MARROW: CPT

## 2024-03-04 PROCEDURE — 88291 CYTO/MOLECULAR REPORT: CPT | Performed by: PHYSICIAN ASSISTANT

## 2024-03-04 PROCEDURE — 88189 FLOWCYTOMETRY/READ 16 & >: CPT | Performed by: PHYSICIAN ASSISTANT

## 2024-03-04 PROCEDURE — 36415 COLL VENOUS BLD VENIPUNCTURE: CPT

## 2024-03-08 LAB
CELL COUNT (BLOOD): 15.2 X10*3/UL
CELL POPULATIONS: NORMAL
CYTOGENETICS/MOLECULAR TEST ORDERED: NORMAL
DIAGNOSIS: NORMAL
FLOW DIFFERENTIAL: NORMAL
FLOW TEST ORDERED: NORMAL
LAB TEST METHOD: NORMAL
NUMBER OF CELLS COLLECTED: NORMAL PER TUBE
PATH REPORT.TOTAL CANCER: NORMAL
RBC MORPH BLD: NORMAL
SIGNATURE COMMENT: NORMAL
SPECIMEN VIABILITY: NORMAL
WBC MORPH BLD: NORMAL

## 2024-03-15 LAB
CHROM ANALY OVERALL INTERP-IMP: NORMAL
ELECTRONICALLY COSIGNED BY CYTOGENETICS: NORMAL
ELECTRONICALLY SIGNED BY CYTOGENETICS: NORMAL
STRUCT VAR ISCN NAME: NORMAL

## 2024-04-22 LAB
CHROM ANALY OVERALL INTERP-IMP: NORMAL
ELECTRONICALLY SIGNED BY CYTOGENETICS: NORMAL

## 2024-06-03 ENCOUNTER — APPOINTMENT (OUTPATIENT)
Dept: PRIMARY CARE | Facility: HOSPITAL | Age: 69
End: 2024-06-03
Payer: COMMERCIAL

## 2024-08-21 ENCOUNTER — APPOINTMENT (OUTPATIENT)
Dept: HEMATOLOGY/ONCOLOGY | Facility: CLINIC | Age: 69
End: 2024-08-21
Payer: COMMERCIAL

## 2025-02-06 ENCOUNTER — HOSPITAL ENCOUNTER (EMERGENCY)
Facility: HOSPITAL | Age: 70
Discharge: HOME | End: 2025-02-06
Payer: COMMERCIAL

## 2025-02-06 VITALS
SYSTOLIC BLOOD PRESSURE: 147 MMHG | DIASTOLIC BLOOD PRESSURE: 82 MMHG | BODY MASS INDEX: 27.28 KG/M2 | HEART RATE: 68 BPM | WEIGHT: 180 LBS | RESPIRATION RATE: 16 BRPM | OXYGEN SATURATION: 100 % | HEIGHT: 68 IN | TEMPERATURE: 97.8 F

## 2025-02-06 DIAGNOSIS — G89.4 CHRONIC PAIN SYNDROME: Primary | ICD-10-CM

## 2025-02-06 LAB
FLUAV RNA RESP QL NAA+PROBE: NOT DETECTED
FLUBV RNA RESP QL NAA+PROBE: NOT DETECTED
SARS-COV-2 RNA RESP QL NAA+PROBE: NOT DETECTED

## 2025-02-06 PROCEDURE — 99284 EMERGENCY DEPT VISIT MOD MDM: CPT

## 2025-02-06 PROCEDURE — 2500000004 HC RX 250 GENERAL PHARMACY W/ HCPCS (ALT 636 FOR OP/ED)

## 2025-02-06 PROCEDURE — 96372 THER/PROPH/DIAG INJ SC/IM: CPT

## 2025-02-06 PROCEDURE — 87636 SARSCOV2 & INF A&B AMP PRB: CPT

## 2025-02-06 RX ORDER — ORPHENADRINE CITRATE 30 MG/ML
60 INJECTION INTRAMUSCULAR; INTRAVENOUS ONCE
Status: COMPLETED | OUTPATIENT
Start: 2025-02-06 | End: 2025-02-06

## 2025-02-06 RX ORDER — ORPHENADRINE CITRATE 30 MG/ML
INJECTION INTRAMUSCULAR; INTRAVENOUS
Status: COMPLETED
Start: 2025-02-06 | End: 2025-02-06

## 2025-02-06 RX ORDER — METHOCARBAMOL 500 MG/1
500 TABLET, FILM COATED ORAL 3 TIMES DAILY
Qty: 21 TABLET | Refills: 0 | Status: SHIPPED | OUTPATIENT
Start: 2025-02-06 | End: 2025-02-13

## 2025-02-06 RX ORDER — KETOROLAC TROMETHAMINE 15 MG/ML
INJECTION, SOLUTION INTRAMUSCULAR; INTRAVENOUS
Status: COMPLETED
Start: 2025-02-06 | End: 2025-02-06

## 2025-02-06 RX ORDER — KETOROLAC TROMETHAMINE 15 MG/ML
15 INJECTION, SOLUTION INTRAMUSCULAR; INTRAVENOUS ONCE
Status: COMPLETED | OUTPATIENT
Start: 2025-02-06 | End: 2025-02-06

## 2025-02-06 RX ADMIN — ORPHENADRINE CITRATE 60 MG: 30 INJECTION INTRAMUSCULAR; INTRAVENOUS at 17:27

## 2025-02-06 RX ADMIN — KETOROLAC TROMETHAMINE 15 MG: 15 INJECTION, SOLUTION INTRAMUSCULAR; INTRAVENOUS at 17:27

## 2025-02-06 RX ADMIN — ORPHENADRINE CITRATE 60 MG: 60 INJECTION INTRAMUSCULAR; INTRAVENOUS at 17:27

## 2025-02-06 ASSESSMENT — PAIN DESCRIPTION - DESCRIPTORS: DESCRIPTORS: DISCOMFORT

## 2025-02-06 ASSESSMENT — PAIN SCALES - GENERAL
PAINLEVEL_OUTOF10: 9
PAINLEVEL_OUTOF10: 7

## 2025-02-06 ASSESSMENT — PAIN - FUNCTIONAL ASSESSMENT: PAIN_FUNCTIONAL_ASSESSMENT: 0-10

## 2025-02-06 ASSESSMENT — COLUMBIA-SUICIDE SEVERITY RATING SCALE - C-SSRS
1. IN THE PAST MONTH, HAVE YOU WISHED YOU WERE DEAD OR WISHED YOU COULD GO TO SLEEP AND NOT WAKE UP?: NO
6. HAVE YOU EVER DONE ANYTHING, STARTED TO DO ANYTHING, OR PREPARED TO DO ANYTHING TO END YOUR LIFE?: NO
2. HAVE YOU ACTUALLY HAD ANY THOUGHTS OF KILLING YOURSELF?: NO

## 2025-02-06 NOTE — ED PROVIDER NOTES
Limitations to history: None  Independent Historians: Family  External Records Reviewed: HIE, OARRS, outpatient notes, inpatient notes, paper charts if needed    History of Present Illness:  Patient is a 69-year-old female with a past medical history positive for cervical spinal cord compression, chronic lumbar radiculopathy, cervical myelopathy with cervical radiculopathy, cervical kyphosis who presents to ED chief complaint of worsening myalgias, pain all over her body.  Patient reports she is scheduled for her pain stimulator on February 10th.  Patient denies any recent injury, falls, trauma.  Patient reports she cannot get relief despite the use of ibuprofen, tizanidine and her muscle relaxers at home.  Patient denies any other systemic symptoms of fevers, chills, nausea, vomiting, diarrhea.  Patient is alert and orient x 3 upon examination, in otherwise no acute distress.    Denies HA, C/P, SOB, ABD pain, Nausea, Vomiting, Diarrhea, Weakness, Dizziness, Fever, Chills.    PMFSH:   As per HPI, otherwise nurses notes reviewed in EMR    Physical Exam:  Appearance: Alert, oriented x3, supine on exam table with head elevated, cooperative, in no acute distress. Well nourished & well hydrated.      Skin: Intact, dry skin, no lesions, rash, petechiae or purpura.     Eyes: PERRLA, EOMs intact, Conjunctiva pink with no redness or exudates. No scleral icterus.     Ears: Hearing grossly intact.      Nose: Nares patent, no epistaxis.     Mouth: Dentition without concerning abnormalities. no obstruction of posterior pharynx.     Neck: Supple, without meningismus. Trachea at midline.     Pulmonary: Clear bilaterally with good chest wall excursion. No rales, rhonchi or wheezing. No accessory muscle use or stridor. Talking in full sentences.     Cardiac: Normal S1, S2 without murmur, rub, gallop or extrasystole.     Abdomen: Soft, nontender to light and deep palpation to all quadrants, normoactive bowel sounds.  No palpable  organomegaly.  No rebound or guarding.     Genitourinary: Physical exam deferred.     Musculoskeletal: Normal gait. Full range of motion to all extremities. Rest of the exam reveals no pain on palpation, instability, or deformity. Pulses full and equal. No cyanosis or clubbing. capillary refill <2 seconds to all examined digits.     Neurological:  Cranial nerves II through XII are grossly intact, normal sensation, no weakness, no focal findings identified.      Psychiatric: Appropriate mood and affect.    Labs Reviewed   INFLUENZA A AND B PCR - Normal       Result Value    Flu A Result Not Detected      Flu B Result Not Detected      Narrative:     This assay is an in vitro diagnostic multiplex nucleic acid amplification test for the detection and discrimination of Influenza A & B from nasopharyngeal specimens, and has been validated for use at [facility]. Negative results do not preclude Influenza A/B infections, and should not be used as the sole basis for diagnosis, treatment, or other management decisions. If Influenza A/B and RSV PCR results are negative, testing for Parainfluenza virus, Adenovirus and Metapneumovirus is routinely performed for [facility] [place]ology and intensive care inpatients, and is available on other patients by placing an add-on request.   SARS-COV-2 PCR - Normal    Coronavirus 2019, PCR Not Detected      Narrative:     This assay is an FDA-cleared, in vitro diagnostic nucleic acid amplification test for the qualitative detection and differentiation of SARS CoV-2 from nasopharyngeal specimens collected from individuals with signs and symptoms of respiratory tract infections, and has been validated for use at [facility]. Negative results do not preclude COVID-19 infections and should not be used as the sole basis for diagnosis, treatment, or other management decisions. Testing for SARS CoV-2 is recommended only for patients who meet current  clinical and/or epidemiological criteria defined by federal, state, or local public health directives.      No orders to display                  Repeat Evaluation below    Summary:  Medical Decision Making:   Patient presented as described in HPI. Patient case including ROS, PE, and treatment and plan discussed with ED attending if attached as cosigner. Due to patients presentation orders completed include as documented.  Patient evaluated for complaints of worsening myalgias, pain all over her body that is chronic.  Patient reports she is scheduled for pain stimulator on February 10, 4 days from now.  Patient was found to be afebrile, nontachycardic, nonhypoxic.  Patient reports that Tylenol, Motrin and her muscle relaxers at home have not been working.  Patient was given Norflex, Toradol while in ED which improved her pain slightly.  Viral swabs performed due to patient's complaint of myalgias.  Viral swabs negative.  Patient will be discharged home with prescription for Robaxin, to take as needed for musculoskeletal chronic pain.  Patient aware to follow-up with her providers regarding her pain stimulator in 4 days.  Patient was advised to follow up with PCP or recommended provider in 2-3 days for another evaluation and exam. I advised patient/guardian to return or go to closest emergency room immediately if symptoms change, get worse, new symptoms develop prior to follow up. If there is no improvement in symptoms in the next 24 hours they are advised to return for further evaluation and exam. I also explained the plan and treatment course. Patient/guardian is in agreement with plan, treatment course, and follow up and states verbally that they will comply.    Tests/Medications/Escalations of Care considered but not given:    Patient care discussed with: N/A  Social Determinants affecting care: N/A    Final diagnosis and disposition as documented in impression    Homegoing. I discussed the differential; results  and discharge plan with the patient and/or family/friend/caregiver if present.  I emphasized the importance of follow-up with the physician I referred them to in the timeframe recommended.  I explained reasons for the patient to return to the Emergency Department. They agreed that if they feel their condition is worsening or if they have any other concern they should call 911 immediately for further assistance. I gave the patient an opportunity to ask all questions they had and answered all of them accordingly. They understand return precautions and discharge instructions. The patient and/or family/friend/caregiver expressed understanding verbally and that they would comply.       Disposition:  Discharge       This note has been transcribed using voice recognition and may contain grammatical errors, misplaced words, incorrect words, incorrect phrases or other errors.     ELIE Syed-CNP  02/06/25 1839       ELIE Syed-AGUSTÍN  02/06/25 1830

## 2025-02-06 NOTE — ED TRIAGE NOTES
Pt states she is having pain all over her body, hands, neck, back, arms, legs, more severe over the last few days. She had neck surgery 4 years ago and pt has had injections before for pain and physical therapy. Pt has seen a doctor about having a pain pump placed, however the follow up appointment is not until the 10th. Pt has tried to take her tizanidine and ibuprofen at home with no relief of her pain. Pt states she also is needing to have a R knee replacement.